# Patient Record
Sex: MALE | Race: WHITE | NOT HISPANIC OR LATINO | Employment: FULL TIME
[De-identification: names, ages, dates, MRNs, and addresses within clinical notes are randomized per-mention and may not be internally consistent; named-entity substitution may affect disease eponyms.]

---

## 2017-05-19 ENCOUNTER — RECORDS - HEALTHEAST (OUTPATIENT)
Dept: ADMINISTRATIVE | Facility: OTHER | Age: 42
End: 2017-05-19

## 2017-05-24 ENCOUNTER — AMBULATORY - HEALTHEAST (OUTPATIENT)
Dept: HEALTH INFORMATION MANAGEMENT | Facility: CLINIC | Age: 42
End: 2017-05-24

## 2018-02-28 ENCOUNTER — RECORDS - HEALTHEAST (OUTPATIENT)
Dept: ADMINISTRATIVE | Facility: OTHER | Age: 43
End: 2018-02-28

## 2019-04-18 ENCOUNTER — RECORDS - HEALTHEAST (OUTPATIENT)
Dept: ADMINISTRATIVE | Facility: OTHER | Age: 44
End: 2019-04-18

## 2019-05-17 RX ORDER — ALBUTEROL SULFATE 90 UG/1
2 AEROSOL, METERED RESPIRATORY (INHALATION)
Status: SHIPPED | COMMUNITY
Start: 2017-05-19

## 2019-05-17 RX ORDER — CETIRIZINE HYDROCHLORIDE 10 MG/1
TABLET ORAL
Status: SHIPPED | COMMUNITY
Start: 2019-05-17

## 2019-05-20 ENCOUNTER — OFFICE VISIT - HEALTHEAST (OUTPATIENT)
Dept: FAMILY MEDICINE | Facility: CLINIC | Age: 44
End: 2019-05-20

## 2019-05-20 ENCOUNTER — COMMUNICATION - HEALTHEAST (OUTPATIENT)
Dept: TELEHEALTH | Facility: CLINIC | Age: 44
End: 2019-05-20

## 2019-05-20 DIAGNOSIS — B02.9 HERPES ZOSTER WITHOUT COMPLICATION: ICD-10-CM

## 2019-05-20 ASSESSMENT — MIFFLIN-ST. JEOR: SCORE: 2168.82

## 2019-06-21 ENCOUNTER — OFFICE VISIT - HEALTHEAST (OUTPATIENT)
Dept: FAMILY MEDICINE | Facility: CLINIC | Age: 44
End: 2019-06-21

## 2019-06-21 ENCOUNTER — COMMUNICATION - HEALTHEAST (OUTPATIENT)
Dept: TELEHEALTH | Facility: CLINIC | Age: 44
End: 2019-06-21

## 2019-06-21 DIAGNOSIS — E66.9 OBESITY (BMI 30-39.9): ICD-10-CM

## 2019-06-21 DIAGNOSIS — R53.82 CHRONIC FATIGUE: ICD-10-CM

## 2019-06-21 DIAGNOSIS — Z00.00 ROUTINE MEDICAL EXAM: ICD-10-CM

## 2019-06-21 DIAGNOSIS — Z23 NEED FOR TETANUS BOOSTER: ICD-10-CM

## 2019-06-21 LAB
ANION GAP SERPL CALCULATED.3IONS-SCNC: 10 MMOL/L (ref 5–18)
BASOPHILS # BLD AUTO: 0 THOU/UL (ref 0–0.2)
BASOPHILS NFR BLD AUTO: 1 % (ref 0–2)
BUN SERPL-MCNC: 14 MG/DL (ref 8–22)
CALCIUM SERPL-MCNC: 9.8 MG/DL (ref 8.5–10.5)
CHLORIDE BLD-SCNC: 108 MMOL/L (ref 98–107)
CHOLEST SERPL-MCNC: 210 MG/DL
CO2 SERPL-SCNC: 22 MMOL/L (ref 22–31)
CREAT SERPL-MCNC: 0.99 MG/DL (ref 0.7–1.3)
EOSINOPHIL # BLD AUTO: 0.3 THOU/UL (ref 0–0.4)
EOSINOPHIL NFR BLD AUTO: 4 % (ref 0–6)
ERYTHROCYTE [DISTWIDTH] IN BLOOD BY AUTOMATED COUNT: 12 % (ref 11–14.5)
FASTING STATUS PATIENT QL REPORTED: YES
GFR SERPL CREATININE-BSD FRML MDRD: >60 ML/MIN/1.73M2
GLUCOSE BLD-MCNC: 83 MG/DL (ref 70–125)
HCT VFR BLD AUTO: 42.7 % (ref 40–54)
HDLC SERPL-MCNC: 39 MG/DL
HGB BLD-MCNC: 14.6 G/DL (ref 14–18)
LDLC SERPL CALC-MCNC: 126 MG/DL
LYMPHOCYTES # BLD AUTO: 2.3 THOU/UL (ref 0.8–4.4)
LYMPHOCYTES NFR BLD AUTO: 33 % (ref 20–40)
MCH RBC QN AUTO: 29.1 PG (ref 27–34)
MCHC RBC AUTO-ENTMCNC: 34.2 G/DL (ref 32–36)
MCV RBC AUTO: 85 FL (ref 80–100)
MONOCYTES # BLD AUTO: 0.7 THOU/UL (ref 0–0.9)
MONOCYTES NFR BLD AUTO: 11 % (ref 2–10)
NEUTROPHILS # BLD AUTO: 3.5 THOU/UL (ref 2–7.7)
NEUTROPHILS NFR BLD AUTO: 51 % (ref 50–70)
PLATELET # BLD AUTO: 213 THOU/UL (ref 140–440)
PMV BLD AUTO: 8.1 FL (ref 7–10)
POTASSIUM BLD-SCNC: 4.3 MMOL/L (ref 3.5–5)
RBC # BLD AUTO: 5.01 MILL/UL (ref 4.4–6.2)
SODIUM SERPL-SCNC: 140 MMOL/L (ref 136–145)
TRIGL SERPL-MCNC: 224 MG/DL
TSH SERPL DL<=0.005 MIU/L-ACNC: 1.29 UIU/ML (ref 0.3–5)
WBC: 6.8 THOU/UL (ref 4–11)

## 2019-06-21 ASSESSMENT — MIFFLIN-ST. JEOR: SCORE: 2393.57

## 2019-06-24 LAB — 25(OH)D3 SERPL-MCNC: 19.4 NG/ML (ref 30–80)

## 2019-06-25 ENCOUNTER — COMMUNICATION - HEALTHEAST (OUTPATIENT)
Dept: FAMILY MEDICINE | Facility: CLINIC | Age: 44
End: 2019-06-25

## 2020-12-15 ENCOUNTER — OFFICE VISIT - HEALTHEAST (OUTPATIENT)
Dept: FAMILY MEDICINE | Facility: CLINIC | Age: 45
End: 2020-12-15

## 2020-12-15 DIAGNOSIS — K62.89 RECTAL PAIN: ICD-10-CM

## 2020-12-15 ASSESSMENT — MIFFLIN-ST. JEOR: SCORE: 2310.33

## 2021-05-28 NOTE — PROGRESS NOTES
1. Herpes zoster without complication          Plan: He will continue with the Tylenol or ibuprofen as needed for any discomfort or pain that he has.  He does not really think it warrants anything more significant for pain.  He feels that overall is getting better.    For his constipation, I suggested that he try some MiraLAX or other daily fiber therapy that he could try.  He also should drink more water and told he would be benefited by exercising a bit more as well.    He can follow-up here in 6 to 8 weeks if needed if this is getting better, or he did mention doing a complete physical at some point soon to.    Subjective: 43-year-old male new patient to our clinic who is here today for a follow-up.  He has been diagnosed with herpes zoster a few weeks ago.  He was put on acyclovir and did very well.  The rash is essentially resolved, he still does have a bit of numbness or burning is feeling in that dermatome on the right side about the T6 level.  He states that is really not all that bad and something that he can deal with ibuprofen or Tylenol.    He has been dealing with some constipation recently as well and he wonders if the 2 are interconnected.  He states that when he gets a bit constipated and bloated it makes the numbness a bit worse on that area where the zoster was.  He is been using some daily fiber therapy occasionally and has been moderately helpful.    Objective: Well-appearing male no acute distress.  Vital signs as noted.  Chest clear to auscultation.  Heart regular rate and rhythm.Patient is new patient to the clinic. Please see past medical history, surgical history, social history and family history, all of which were completed in their entirety today.     Soft nontender senna bowel sounds present all quadrants.  He does have a resolving zoster rash in the above described area.

## 2021-06-03 VITALS — BODY MASS INDEX: 39.22 KG/M2 | HEIGHT: 74 IN | WEIGHT: 305.6 LBS

## 2021-06-03 VITALS — HEIGHT: 62 IN | BODY MASS INDEX: 57.1 KG/M2 | WEIGHT: 310.3 LBS

## 2021-06-05 VITALS
DIASTOLIC BLOOD PRESSURE: 62 MMHG | WEIGHT: 299.5 LBS | SYSTOLIC BLOOD PRESSURE: 128 MMHG | BODY MASS INDEX: 38.44 KG/M2 | HEART RATE: 80 BPM | HEIGHT: 74 IN | OXYGEN SATURATION: 97 %

## 2021-06-13 NOTE — PROGRESS NOTES
ASSESSMENT:  1. Rectal pain    Since this is been going on for more than a month now, and he is tried the over-the-counter remedies that I might suggest if it had just started, and he still having a lot of pain which does not seem to be getting better, I will send him to the colorectal specialist and see what they might make of it and to see if he needs any scoping done or whether they have other remedies they might try for him.  We can follow-up with him afterwards as needed.      - Ambulatory referral to Colorectal Surgery          PLAN:  There are no Patient Instructions on file for this visit.    Orders Placed This Encounter   Procedures     Ambulatory referral to Colorectal Surgery     Referral Priority:   Routine     Referral Type:   Consultation     Referral Reason:   Evaluation and Treatment     Requested Specialty:   Colon and Rectal Surgery     Number of Visits Requested:   1     There are no discontinued medications.    No follow-ups on file.    CHIEF COMPLAINT:  Chief Complaint   Patient presents with     GI Problem     Pain with bowel movements, does not seem to be a hemorrhoid but not sure, normal stools, taking a stool softner to help       HISTORY OF PRESENT ILLNESS:  Davon is a 45 y.o. male presenting to the clinic today with some rectal pain that he has been experiencing for more than a month now.  He has has pain when he passes his bowel movements.  He does not think that it is a hemorrhoid.  He does not feel anything on the outside such as a lump.  He has been taking a stool softener when trying to make it a little bit better but it is not really made a difference.  It hurts when he passes his stool whether he has a hard bowel movement or a soft 1.  He has not had any bleeding with the stool he has had no hematochezia or melena.  He has had no blood on the paper when he has been wiping.  He has had no change in his bowel movements other than what is mentioned about using a stool softener  "recently.  He notes of no trauma to the area.  It does not seem to correlate with any food changes or what he eats.  There is no abdominal pain that he is noticed with this.  No fevers or chills or night sweats or weight loss.  No vomiting or nausea.    REVIEW OF SYSTEMS:     All other systems are negative.    PFSH:    Reviewed      TOBACCO USE:  Social History     Tobacco Use   Smoking Status Never Smoker   Smokeless Tobacco Never Used       VITALS:  Vitals:    12/15/20 1121   BP: 128/62   Patient Site: Left Arm   Patient Position: Sitting   Cuff Size: Adult Large   Pulse: 80   SpO2: 97%   Weight: (!) 299 lb 8 oz (135.9 kg)   Height: 6' 1.5\" (1.867 m)     Wt Readings from Last 3 Encounters:   12/15/20 (!) 299 lb 8 oz (135.9 kg)   06/21/19 (!) 305 lb 9.6 oz (138.6 kg)   05/20/19 (!) 310 lb 4.8 oz (140.8 kg)     Body mass index is 38.98 kg/m .    PHYSICAL EXAM:  Constitutional:  Well appearing patient in no acute distress.  Vitals:  Per nursing notes.    HEENT:  Normocephalic, atraumatic.  Ears are clear bilaterally, with no fluid or redness, and landmarks visible.  Pupils are equal and reactive to light, extraocular muscles intact, visual fields are full.  Nose is normal, and oropharynx is clear without redness.    Neck is without lymphadenopathy.    Lungs:  Clear to auscultation bilaterally without wheezes, rales or rhonchi.   Cardiac:  Regular rate and rhythm without murmurs, rubs, or gallops.     Rectal exam shows a normal-appearing rectal area with no obvious external hemorrhoids, no fissures and no other skin changes.    MEDICATIONS:  Current Outpatient Medications   Medication Sig Dispense Refill     ascorbic acid (LEATHA-C ORAL) Take by mouth.       cyanocobalamin 500 MCG tablet Take 500 mcg by mouth daily.       albuterol (PROAIR HFA;PROVENTIL HFA;VENTOLIN HFA) 90 mcg/actuation inhaler Inhale 2 puffs.       cetirizine (ZYRTEC) 10 MG tablet Take by mouth.       fluticasone propionate (FLONASE) 50 mcg/actuation " nasal spray 1 spray into each nostril.       No current facility-administered medications for this visit.

## 2021-06-16 PROBLEM — E66.9 OBESITY (BMI 30-39.9): Status: ACTIVE | Noted: 2019-06-21

## 2021-06-19 NOTE — LETTER
Letter by Jass Cobos MD at      Author: Jass Cobos MD Service: -- Author Type: --    Filed:  Encounter Date: 6/25/2019 Status: (Other)         Davon Martel  [address] [facility] [id number]             June 25, 2019         Dear Mr. Martel,    Below are the results from your recent visit:    Resulted Orders   Basic Metabolic Panel   Result Value Ref Range    Sodium 140 136 - 145 mmol/L    Potassium 4.3 3.5 - 5.0 mmol/L    Chloride 108 (H) 98 - 107 mmol/L    CO2 22 22 - 31 mmol/L    Anion Gap, Calculation 10 5 - 18 mmol/L    Glucose 83 70 - 125 mg/dL    Calcium 9.8 8.5 - 10.5 mg/dL    BUN 14 8 - 22 mg/dL    Creatinine 0.99 0.70 - 1.30 mg/dL    GFR MDRD Af Amer >60 >60 mL/min/1.73m2    GFR MDRD Non Af Amer >60 >60 mL/min/1.73m2    Narrative    Fasting Glucose reference range is 70-99 mg/dL per  American Diabetes Association (ADA) guidelines.   Lipid Profile   Result Value Ref Range    Triglycerides 224 (H) <=149 mg/dL    Cholesterol 210 (H) <=199 mg/dL    LDL Calculated 126 <=129 mg/dL    HDL Cholesterol 39 (L) >=40 mg/dL    Patient Fasting > 8hrs? Yes    Thyroid Stimulating Hormone (TSH)   Result Value Ref Range    TSH 1.29 0.30 - 5.00 uIU/mL   Vitamin D, Total (25-Hydroxy)   Result Value Ref Range    Vitamin D, Total (25-Hydroxy) 19.4 (L) 30.0 - 80.0 ng/mL    Narrative    Deficiency <10.0 ng/mL  Insufficiency 10.0-29.9 ng/mL  Sufficiency 30.0-80.0 ng/mL  Toxicity (possible) >100.0 ng/mL   HM1 (CBC with Diff)   Result Value Ref Range    WBC 6.8 4.0 - 11.0 thou/uL    RBC 5.01 4.40 - 6.20 mill/uL    Hemoglobin 14.6 14.0 - 18.0 g/dL    Hematocrit 42.7 40.0 - 54.0 %    MCV 85 80 - 100 fL    MCH 29.1 27.0 - 34.0 pg    MCHC 34.2 32.0 - 36.0 g/dL    RDW 12.0 11.0 - 14.5 %    Platelets 213 140 - 440 thou/uL    MPV 8.1 7.0 - 10.0 fL    Neutrophils % 51 50 - 70 %    Lymphocytes % 33 20 - 40 %    Monocytes % 11 (H) 2 - 10 %    Eosinophils % 4 0 - 6 %    Basophils % 1 0 - 2 %    Neutrophils Absolute 3.5 2.0 -  7.7 thou/uL    Lymphocytes Absolute 2.3 0.8 - 4.4 thou/uL    Monocytes Absolute 0.7 0.0 - 0.9 thou/uL    Eosinophils Absolute 0.3 0.0 - 0.4 thou/uL    Basophils Absolute 0.0 0.0 - 0.2 thou/uL        The only significant finding here is your vitamin D.  It's pretty low.  I would suggest you get a  supplement (over the counter), vitamin D, 2000 IU's, and take one daily.    This could be making you a bit tired.  Also, as we discussed, increasing activity will help this as well.      Thyroid test is fine.      Your kidney function tests are normal, and your blood sugar level is also normal.      Cholesterol is a bit elevated, but nothing terrible at this point.      Blood counts are normal.    Please call with questions or contact us using [email or web address].    Sincerely,        Electronically signed by Jass Cobos MD

## 2021-06-27 NOTE — PROGRESS NOTES
Progress Notes by Jass Cobos MD at 6/21/2019  9:00 AM     Author: Jass Cobos MD Service: -- Author Type: Physician    Filed: 6/24/2019  7:27 AM Encounter Date: 6/21/2019 Status: Signed    : Jass Cobos MD (Physician)       MALE PREVENTATIVE EXAM    Assessment and Plan:       1. Routine medical exam    Generally the patient is doing very well.  We discussed healthy lifestyles, including adequate exercise (3-4 times a week for 20-30 minutes), and a healthy diet.  Patient should return for annual physicals, and we can also see them here as needed.       2. Obesity (BMI 30-39.9)    We discussed different options for this today.  This, in combination with his chronic fatigue, let me to get some lab work which is detailed below.  We can follow-up with him on the results when the become available.  We discussed healthy lifestyles of having a good diet low in carbohydrates and high in protein, as well as daily optimally exercise that he would do to try to decrease his caloric intake and to burn off more calories causing him to lose weight.  We can follow-up with him in a couple of months on his progress to see how he is doing.  We briefly introduced the idea of referrals out to 1 of our weight loss programs and he will consider that as well.    - Lipid Profile    3. Chronic fatigue    - Basic Metabolic Panel  - HM1(CBC and Differential)  - Thyroid Stimulating Hormone (TSH)  - Vitamin D, Total (25-Hydroxy)  - HM1 (CBC with Diff)    4. Need for tetanus booster    - Td, Preservative Free (green label)        Next follow up:  No follow-ups on file.    Immunization Review  Adult Imm Review: No immunizations due today  BMI: 39      I discussed the following with the patient:   Adult Healthy Living: Importance of regular exercise  Healthy nutrition  Weight loss referral options  Getting adequate sleep  Stress management    I have had an Advance Directives discussion with the patient.    Subjective:   Chief  Complaint: Davon Martel is an 44 y.o. male here for a preventative health visit.     HPI: 44-year-old who is here today for a full physical.  He has mostly resolved the episode of zoster that I saw him for last month.  He still occasionally has a bit of tingling and numbness at the site where the rash was but he feels that it is getting better.    His main deal today is that he would like to lose weight.  He has been overweight most of his life and is struggled with trying to lose weight.  He has tried some diet here and there but nothing is really stuck that well for him.  He is frustrated and would like to lose weight but he has not done a lot of exercising either.  He has some family history of obesity as well but he is battling.    He is not really had a lot of physical issues related to his obesity yet, he has had no chest pain or shortness of breath although he does state that he does have a bit more trouble with exercise tolerance lately.  He blames that however, I am being just out of shape.    He also states that he is fatigued a lot, and that he just does not have the energy that he used to when he was younger.    Healthy Habits  Are you taking a daily aspirin? No  Do you typically exercising at least 40 min, 3-4 times per week?  NO  Do you usually eat at least 4 servings of fruit and vegetables a day, include whole grains and fiber and avoid regularly eating high fat foods? NO  Have you had an eye exam in the past two years? Yes  Do you see a dentist twice per year? Yes  Do you have any concerns regarding sleep? YES, trouble staying asleep, snoring, has not done sleep study in past    Safety Screen  If you own firearms, are they secured in a locked gun cabinet or with trigger locks? The patient does not own any firearms  No data recorded    Review of Systems:  Please see above.  The rest of the review of systems are negative for all systems.     Cancer Screening     Patient has no health maintenance  "due at this time          Patient Care Team:  Jass Cobos MD as PCP - General (Family Medicine)        History     Reviewed By Date/Time Sections Reviewed    Jass Cobos MD 6/24/2019  7:26 AM Medical, Surgical            Objective:   Vital Signs:   Visit Vitals  /72 (Patient Site: Left Arm, Patient Position: Sitting, Cuff Size: Adult Large)   Pulse 64   Ht 6' 1.5\" (1.867 m)   Wt (!) 305 lb 9.6 oz (138.6 kg)   SpO2 97%   BMI 39.77 kg/m           PHYSICAL EXAM    Well developed, well nourished, no acute distress.  HEENT: normocephalic/atraumatic, PERRLA/EOMI, TMs: Gray, normal light reflex, no nasal discharge.  Oral mucosa: no erythema/exudate  Neck: No LAD/masses/thyromegaly/bruits  Lungs: clear bilaterally  Heart: regular rate and rhythm, no murmurs/gallops/rubs.  Abdomen: Normal bowel sounds, soft, non-tender, non-distended, no masses, neg Luis's/McBurney's, no rebound/guarding  Genital: Bilaterally descended testes, no masses, non-tender, no hernia.  No urethral discharge or erythema.  No lesions, normal phallus.  Lymphatics: no supraclavicular/axillary/epitrochlear/inguinal LAD. No edema.  Neuro: A&O x 3, CN II-XII intact, strength 5/5, reflexes symmetric, sensory intact to light touch.  Psych: Behavior appropriate, engaging.  Thought processes congruent, non-tangential.  Musculoskeletal: no gross deformities.  Skin: no rashes or lesions.            Medication List           Accurate as of 6/21/19 11:59 PM. If you have any questions, ask your nurse or doctor.               CONTINUE taking these medications    albuterol 90 mcg/actuation inhaler  Also known as:  PROAIR HFA;PROVENTIL HFA;VENTOLIN HFA  INSTRUCTIONS:  Inhale 2 puffs.        cetirizine 10 MG tablet  Also known as:  ZyrTEC  INSTRUCTIONS:  Take by mouth.        fluticasone propionate 50 mcg/actuation nasal spray  Also known as:  FLONASE  INSTRUCTIONS:  1 spray into each nostril.               Additional Screenings Completed Today:          "

## 2021-12-08 ENCOUNTER — TRANSFERRED RECORDS (OUTPATIENT)
Dept: HEALTH INFORMATION MANAGEMENT | Facility: CLINIC | Age: 46
End: 2021-12-08

## 2023-06-04 ENCOUNTER — HEALTH MAINTENANCE LETTER (OUTPATIENT)
Age: 48
End: 2023-06-04

## 2023-06-07 ENCOUNTER — OFFICE VISIT (OUTPATIENT)
Dept: INTERNAL MEDICINE | Facility: CLINIC | Age: 48
End: 2023-06-07
Payer: COMMERCIAL

## 2023-06-07 VITALS
BODY MASS INDEX: 40.43 KG/M2 | HEART RATE: 73 BPM | TEMPERATURE: 98.1 F | HEIGHT: 74 IN | SYSTOLIC BLOOD PRESSURE: 108 MMHG | OXYGEN SATURATION: 95 % | WEIGHT: 315 LBS | DIASTOLIC BLOOD PRESSURE: 70 MMHG | RESPIRATION RATE: 16 BRPM

## 2023-06-07 DIAGNOSIS — Z82.49 FAMILY HISTORY OF ISCHEMIC HEART DISEASE: ICD-10-CM

## 2023-06-07 DIAGNOSIS — L85.8 KERATOSIS PILARIS: ICD-10-CM

## 2023-06-07 DIAGNOSIS — E66.01 CLASS 3 SEVERE OBESITY DUE TO EXCESS CALORIES WITHOUT SERIOUS COMORBIDITY WITH BODY MASS INDEX (BMI) OF 40.0 TO 44.9 IN ADULT (H): ICD-10-CM

## 2023-06-07 DIAGNOSIS — Z00.00 ANNUAL PHYSICAL EXAM: ICD-10-CM

## 2023-06-07 DIAGNOSIS — E66.813 CLASS 3 SEVERE OBESITY DUE TO EXCESS CALORIES WITHOUT SERIOUS COMORBIDITY WITH BODY MASS INDEX (BMI) OF 40.0 TO 44.9 IN ADULT (H): ICD-10-CM

## 2023-06-07 DIAGNOSIS — R29.818 SUSPECTED SLEEP APNEA: ICD-10-CM

## 2023-06-07 DIAGNOSIS — R79.89 LOW SERUM VITAMIN D: ICD-10-CM

## 2023-06-07 DIAGNOSIS — Z12.11 SCREEN FOR COLON CANCER: ICD-10-CM

## 2023-06-07 DIAGNOSIS — Z13.220 SCREENING FOR LIPOID DISORDERS: ICD-10-CM

## 2023-06-07 DIAGNOSIS — K60.2 ANAL FISSURE: ICD-10-CM

## 2023-06-07 DIAGNOSIS — R21 RASH: Primary | ICD-10-CM

## 2023-06-07 PROBLEM — Z11.4 SCREENING FOR HIV (HUMAN IMMUNODEFICIENCY VIRUS): Status: ACTIVE | Noted: 2023-06-07

## 2023-06-07 PROBLEM — Z11.4 SCREENING FOR HIV (HUMAN IMMUNODEFICIENCY VIRUS): Status: RESOLVED | Noted: 2023-06-07 | Resolved: 2023-06-07

## 2023-06-07 LAB
CHOLEST SERPL-MCNC: 208 MG/DL
DEPRECATED CALCIDIOL+CALCIFEROL SERPL-MC: 31 UG/L (ref 20–75)
HBA1C MFR BLD: 5.2 % (ref 0–5.6)
HDLC SERPL-MCNC: 33 MG/DL
LDLC SERPL CALC-MCNC: 121 MG/DL
NONHDLC SERPL-MCNC: 175 MG/DL
TRIGL SERPL-MCNC: 271 MG/DL

## 2023-06-07 PROCEDURE — 36415 COLL VENOUS BLD VENIPUNCTURE: CPT | Performed by: NURSE PRACTITIONER

## 2023-06-07 PROCEDURE — 82306 VITAMIN D 25 HYDROXY: CPT | Performed by: NURSE PRACTITIONER

## 2023-06-07 PROCEDURE — 83036 HEMOGLOBIN GLYCOSYLATED A1C: CPT | Performed by: NURSE PRACTITIONER

## 2023-06-07 PROCEDURE — 99396 PREV VISIT EST AGE 40-64: CPT | Performed by: NURSE PRACTITIONER

## 2023-06-07 PROCEDURE — 80061 LIPID PANEL: CPT | Performed by: NURSE PRACTITIONER

## 2023-06-07 PROCEDURE — 99214 OFFICE O/P EST MOD 30 MIN: CPT | Mod: 25 | Performed by: NURSE PRACTITIONER

## 2023-06-07 RX ORDER — TRIAMCINOLONE ACETONIDE 1 MG/G
CREAM TOPICAL 3 TIMES DAILY
Qty: 15 G | Refills: 0 | Status: SHIPPED | OUTPATIENT
Start: 2023-06-07

## 2023-06-07 ASSESSMENT — ENCOUNTER SYMPTOMS
HEMATOCHEZIA: 0
HEMATURIA: 0
CONSTIPATION: 0
COUGH: 0
ABDOMINAL PAIN: 0
CHILLS: 0

## 2023-06-07 NOTE — PATIENT INSTRUCTIONS
Call the [facility] to request a consultation about your snoring and to decide if you should have a sleep study at home.  [phone].    Call [facility] to set up your screening colonoscopy [phone].    We will check some lab work today.  Results will come through [email or web address].    Blood pressure and other vital signs look good.    Use the triamcinolone steroid cream on your wrist 3 times daily for the next 2 weeks.  Apply moisturizer cream to your wrist in the evening before you go to bed.    Call and schedule a coronary artery calcium test to stratify your cardiovascular risk.  [phone].    Follow-up in 1 year or sooner if needed

## 2023-06-07 NOTE — PROGRESS NOTES
SUBJECTIVE:   CC: Davon is an 47 year old who presents for preventative health visit.       6/7/2023     9:23 AM   Additional Questions   Roomed by      Healthy Habits:     Getting at least 3 servings of Calcium per day:  Yes    Bi-annual eye exam:  Yes    Dental care twice a year:  Yes    Sleep apnea or symptoms of sleep apnea:  Daytime drowsiness and Excessive snoring    Diet:  Regular (no restrictions)    Frequency of exercise:  4-5 days/week    Duration of exercise:  45-60 minutes    Taking medications regularly:  Yes    Medication side effects:  Not applicable    PHQ-2 Total Score: 0    Additional concerns today:  No        Today's PHQ-2 Score:       6/7/2023     9:20 AM   PHQ-2 ( 1999 [employer])   Q1: Little interest or pleasure in doing things 0   Q2: Feeling down, depressed or hopeless 0   PHQ-2 Score 0   Q1: Little interest or pleasure in doing things Not at all   Q2: Feeling down, depressed or hopeless Not at all   PHQ-2 Score 0           Social History     Tobacco Use     Smoking status: Never     Smokeless tobacco: Never   Vaping Use     Vaping status: Not on file   Substance Use Topics     Alcohol use: Yes     Alcohol/week: 1.0 - 2.0 standard drink of alcohol             6/7/2023     9:20 AM   Alcohol Use   Prescreen: >3 drinks/day or >7 drinks/week? No       Last PSA: No results found for: PSA    Reviewed orders with patient. Reviewed health maintenance and updated orders accordingly - Yes  Lab work is in process    Reviewed and updated as needed this visit by clinical staff   Tobacco  Allergies  Meds              Reviewed and updated as needed this visit by Provider                 No past medical history on file.     Review of Systems   Constitutional: Negative for chills.   HENT: Negative for congestion.    Respiratory: Negative for cough.    Cardiovascular: Negative for chest pain.   Gastrointestinal: Negative for abdominal pain, constipation and hematochezia.   Genitourinary: Negative for  "hematuria.     CONSTITUTIONAL: NEGATIVE for fever, chills, change in weight  INTEGUMENTARY/SKIN: NEGATIVE for worrisome rashes, moles or lesions  EYES: NEGATIVE for vision changes or irritation  ENT: NEGATIVE for ear, mouth and throat problems  RESP: NEGATIVE for significant cough or SOB  CV: NEGATIVE for chest pain, palpitations or peripheral edema  GI: NEGATIVE for nausea, abdominal pain, heartburn, or change in bowel habits   male: negative for dysuria, hematuria, decreased urinary stream, erectile dysfunction, urethral discharge  MUSCULOSKELETAL: NEGATIVE for significant arthralgias or myalgia  NEURO: NEGATIVE for weakness, dizziness or paresthesias  PSYCHIATRIC: NEGATIVE for changes in mood or affect    OBJECTIVE:   /70 (BP Location: Right arm, Patient Position: Sitting, Cuff Size: Adult Large)   Pulse 73   Temp 98.1  F (36.7  C) (Oral)   Resp 16   Ht 1.867 m (6' 1.5\")   Wt 145.4 kg (320 lb 8 oz)   SpO2 95%   BMI 41.71 kg/m      Physical Exam  GENERAL: healthy, alert and no distress  NECK: no adenopathy, no asymmetry, masses, or scars and thyroid normal to palpation  RESP: lungs clear to auscultation - no rales, rhonchi or wheezes  CV: regular rate and rhythm, normal S1 S2, no S3 or S4, no murmur, click or rub, no peripheral edema and peripheral pulses strong  ABDOMEN: soft, nontender, no hepatosplenomegaly, no masses and bowel sounds normal  MS: no gross musculoskeletal defects noted, no edema    Diagnostic Test Results:  Labs reviewed in Epic    ASSESSMENT/PLAN:     1. Annual physical exam  Chronic medical issues reviewed    2. Screen for colon cancer  Due for initial screening  - Colonoscopy Screening  Referral; Future    3. Rash  Dermatitis right wrist should respond to triamcinolone  - triamcinolone (KENALOG) 0.1 % external cream; Apply topically 3 times daily  Dispense: 15 g; Refill: 0    4. Keratosis pilaris  Benign nature of this condition was explained to patient.  Could " consider topical corticosteroids at a future date, if needed    5. Class 3 severe obesity due to excess calories without serious comorbidity with body mass index (BMI) of 40.0 to 44.9 in adult (H)  Screen for diabetes.  Given his obesity, could consider treatment with GLP-1 agonist  - Hemoglobin A1c; Future    6. Low serum vitamin D  History of this, intermittent use of 2000 IU daily  - Vitamin D deficiency screening; Future    7. Screening for lipoid disorders  - Lipid Profile (Chol, Trig, HDL, LDL calc); Future    8. Family history of ischemic heart disease  Both of his parents have had CABG in their 70s.  We will get a CT coronary calcium scan  - CT Coronary Calcium Scan; Future    9. Suspected sleep apnea  He snores loudly.  Moderate suspicion for sleep apnea.  Given the telephone number for [facility]  - Adult Sleep Eval [facility] Management  Referral; Future    10. Anal fissure  This was a problem last year and [facility], diagnosed with anal fissure, still has intermittent mild pain    Patient Instructions   Call the [facility] to request a consultation about your snoring and to decide if you should have a sleep study at home.  [phone].    Call [facility] to set up your screening colonoscopy [phone].    We will check some lab work today.  Results will come through [employer].    Blood pressure and other vital signs look good.    Use the triamcinolone steroid cream on your wrist 3 times daily for the next 2 weeks.  Apply moisturizer cream to your wrist in the evening before you go to bed.    Call and schedule a coronary artery calcium test to stratify your cardiovascular risk.  [phone].    Follow-up in 1 year or sooner if needed        Patient has been advised of split billing requirements and indicates understanding: Yes      COUNSELING:   Reviewed preventive health counseling, as reflected in patient instructions        "Regular exercise       Healthy diet/nutrition       Vision screening       Hearing screening      BMI:   Estimated body mass index is 41.71 kg/m  as calculated from the following:    Height as of this encounter: 1.867 m (6' 1.5\").    Weight as of this encounter: 145.4 kg (320 lb 8 oz).   Weight management plan: Discussed healthy diet and exercise guidelines      He reports that he has never smoked. He has never used smokeless tobacco.        Yo Sprague CNP  [facility]  "

## 2023-09-21 ENCOUNTER — TRANSFERRED RECORDS (OUTPATIENT)
Dept: HEALTH INFORMATION MANAGEMENT | Facility: CLINIC | Age: 48
End: 2023-09-21
Payer: COMMERCIAL

## 2024-07-20 ENCOUNTER — HEALTH MAINTENANCE LETTER (OUTPATIENT)
Age: 49
End: 2024-07-20

## 2025-08-09 ENCOUNTER — HEALTH MAINTENANCE LETTER (OUTPATIENT)
Age: 50
End: 2025-08-09